# Patient Record
Sex: MALE | Race: WHITE | Employment: OTHER | URBAN - METROPOLITAN AREA
[De-identification: names, ages, dates, MRNs, and addresses within clinical notes are randomized per-mention and may not be internally consistent; named-entity substitution may affect disease eponyms.]

---

## 2019-08-07 ENCOUNTER — OFFICE VISIT (OUTPATIENT)
Dept: SLEEP MEDICINE | Facility: CLINIC | Age: 69
End: 2019-08-07
Payer: COMMERCIAL

## 2019-08-07 VITALS
HEART RATE: 89 BPM | HEIGHT: 69 IN | BODY MASS INDEX: 28.44 KG/M2 | OXYGEN SATURATION: 95 % | DIASTOLIC BLOOD PRESSURE: 83 MMHG | WEIGHT: 192 LBS | SYSTOLIC BLOOD PRESSURE: 129 MMHG

## 2019-08-07 DIAGNOSIS — R06.83 SNORING: Primary | ICD-10-CM

## 2019-08-07 PROCEDURE — 99203 OFFICE O/P NEW LOW 30 MIN: CPT | Performed by: INTERNAL MEDICINE

## 2019-08-07 RX ORDER — MULTIVITAMIN,THERAPEUTIC
1 TABLET ORAL DAILY
COMMUNITY

## 2019-08-07 RX ORDER — ONDANSETRON 8 MG/1
8 TABLET, FILM COATED ORAL EVERY 8 HOURS PRN
COMMUNITY

## 2019-08-07 RX ORDER — DIPHENHYDRAMINE HCL 25 MG
25 CAPSULE ORAL EVERY 8 HOURS
COMMUNITY
End: 2019-10-31

## 2019-08-07 RX ORDER — AMOXICILLIN 500 MG
1200 CAPSULE ORAL DAILY
COMMUNITY

## 2019-08-07 RX ORDER — IBUPROFEN 200 MG
200 TABLET ORAL EVERY 8 HOURS PRN
COMMUNITY

## 2019-08-07 RX ORDER — TAMSULOSIN HYDROCHLORIDE 0.4 MG/1
0.4 CAPSULE ORAL DAILY
COMMUNITY

## 2019-08-07 RX ORDER — UBIDECARENONE 100 MG
100 CAPSULE ORAL DAILY
COMMUNITY

## 2019-08-07 ASSESSMENT — MIFFLIN-ST. JEOR: SCORE: 1635.29

## 2019-08-07 NOTE — PROGRESS NOTES
Colin signed OANH to release results from testing at [facility] to [facility], listed Dr. Waldo Lennon, at [address] , phone is [phone] Fax:  [phone], as his PCP at [facility].  Route to Dr. Bundy for her information.  Colin would like test results communicated to Dr. Lennon.

## 2019-08-07 NOTE — NURSING NOTE
"Chief Complaint   Patient presents with     Consult     stopped breathing during colonoscopy, sleeps fine.         Initial /83   Pulse 89   Ht 1.759 m (5' 9.25\")   Wt 87.1 kg (192 lb)   SpO2 95%   BMI 28.15 kg/m   Estimated body mass index is 28.15 kg/m  as calculated from the following:    Height as of this encounter: 1.759 m (5' 9.25\").    Weight as of this encounter: 87.1 kg (192 lb).    Medication Reconciliation: complete    Neck circumference: 17 inches / 43 centimeters.    DME: no    ESS 0  "

## 2019-08-07 NOTE — PROGRESS NOTES
"Sleep Consultation Note:    Date on this visit: 8/7/2019    Colin Gastelum is sent by Waldo Lennon DO for a sleep consultation regarding concern for low oxygen saturation during the colonoscopy obtained in August 2018 and  snoring  Primary Physician: No Ref-Primary, Physician     Chief complaint: \"Stopped breathing during colonoscopy\"    Colin Gastelum 68 year old with PMH of renal calculus who presents to sleep clinic today at the request of his primary care provider for evaluation for possible sleep apnea. He had a colonoscopy done on 8/28/2018  and during the procedure, he had oxygen desaturation and the procedure had to be stopped due to the desaturation.  Upon waking his oxygen saturations returned to normal.  According to the primary care provider's note, as his colonoscopy was stopped early he does need to have a repeat colonoscopy. He  was advised that he be evaluated by sleep medicine prior to attempting colonoscopy again.     Colin never underwent sleep evaluation in the past.    He reports snoring.  There have not been any reports of observed apneas during sleep.  He denies snort arousals or awakenings due to gasping for air or choking.  He denies dry mouth or morning headaches.  He sleeps on his back on his sides.    He denies symptoms of restless legs syndrome. Once a  a week he notices that the sheets and blankets are tossed up in his bed.    He denies nightmares or sleepwalking or sleep eating or dream enactment behavior or cataplexy or sleep paralysis or hallucinations.    He retired last week. His bedtime is around 9:30 PM. He  he has no difficulty falling asleep. It approximately takes 10 minutes for him to fall asleep. He wakes up once to use the bathroom and is able to resume sleep within 10 minutes after the awakening.  He mostly wakes up spontaneously around 6:30 AM but sets up an alarm only if there is something that he needs to do.  He reports  feeling rested most  mornings.  " He denies fatigue or excessive daytime sleepiness. He endorses an Braymer sleepiness score of 0 out of 24.  He does not nap during the day.   He denies drowsiness while driving.  He denies falling asleep at stoplight or head bobbing while driving.  He does not read in the bed or use electronics in bed or watch television in bed.      Social History  Colin retired last week.  He works as . He is single.  He drinks 0 to 2 cups of coffee, none past 10 AM.  He drinks alcohol occasionally and does not drink close to bedtime. Does  not use alcohol as a sleep aid.  Smoking status: Current Some Day Smoker   Packs/day: 0.10   Years: 42.00   Types: Cigarettes   Start date: 1968   Smokeless tobacco: Never Used   Tobacco comment: 3 cigs daily or none.  He does not have plans to quit smoking currently.  Patient does not use drugs.    Allergies:    Allergies not on file    Medications:    Current Outpatient Medications   Medication Sig Dispense Refill     ascorbic acid (VITAMIN C) 500 MG CPCR CR capsule Take 500 mg by mouth daily       co-enzyme Q-10 100 MG CAPS capsule Take 100 mg by mouth daily       diphenhydrAMINE (BENADRYL) 25 MG capsule Take 25 mg by mouth every 8 hours       ibuprofen (ADVIL/MOTRIN) 200 MG tablet Take 200 mg by mouth every 8 hours as needed for mild pain       multivitamin, therapeutic (THERA-VIT) TABS tablet Take 1 tablet by mouth daily       Omega-3 Fatty Acids (FISH OIL) 1200 MG capsule Take 1,200 mg by mouth daily       ondansetron (ZOFRAN) 8 MG tablet Take 8 mg by mouth every 8 hours as needed for nausea       tamsulosin (FLOMAX) 0.4 MG capsule Take 0.4 mg by mouth daily           Problem List:  There are no active problems to display for this patient.       Past Medical/Surgical History:    Calcium oxalate renal stones      Left eye cataract    Detached retina, left     Family history of renal stone   COLONOSCOPY 08/28/2018 polyp, incomplete due to resp desaturation  RETINAL  "DETACHMENT REPAIR     Family History, pertinent to sleep disorders: His father, his older brother and paternal grandfather snored. He has a younger brother who snores    Review of Systems:  A complete review of systems reviewed by me is negative with the exeption of what has been mentioned in the history of present illness, and changes in his vision, retinal detachment and cataract in the left eye, he is thinking about getting  cataract surgery   CONSTITUTIONAL: NEGATIVE for weight gain/loss, fever, chills, sweats or night sweats.  ENT: NEGATIVE for ear pain, sore throat, sinus pain, post-nasal drip, runny nose, bloody nose  CARDIAC: NEGATIVE for fast heartbeats or fluttering in chest, chest pain or pressure, breathlessness when lying flat, swollen legs or swollen feet.  NEUROLOGIC: NEGATIVE headaches, weakness or numbness in the arms or legs.  DERMATOLOGIC: NEGATIVE for rashes, new moles or change in mole(s)  PULMONARY: NEGATIVE SOB at rest, SOB with activity, dry cough, productive cough, coughing up blood, wheezing or whistling when breathing.    GASTROINTESTINAL: NEGATIVE for nausea or vomitting, loose or watery stools, fat or grease in stools, constipation, abdominal pain, bowel movements black in color or blood noted.  GENITOURINARY: NEGATIVE for pain during urination, blood in urine, urinating more frequently than usual  MUSCULOSKELETAL: NEGATIVE for muscle pain, bone or joint pain, swollen joints.  ENDOCRINE: NEGATIVE for increased thirst or urination, diabetes.  LYMPHATIC: NEGATIVE for swollen lymph nodes, lumps or bumps in the breasts or nipple discharge.  PSYCHE: NEGATIVE for depression, anxiety    Physical Examination:  /83   Pulse 89   Ht 1.759 m (5' 9.25\")   Wt 87.1 kg (192 lb)   SpO2 95%   BMI 28.15 kg/m      General: No apparent distress, appropriately groomed  Head: Normocephalic, atraumatic  Eyes: no icterus, PERRL  Nose: Nares patent. No exudate/erythema. No septal deviation " noted.  Mouth: op pink and moist,Orophraynx: Opening is narrowed, uvula: Thick and elongated   Mallampati Class:III   Tonsillar Stage: 1+.  Neck: Supple, Circumference: 16.9 inches  Cardiac: Regular rate and rhythm  Chest: Symmetric air movement, lungs clear to auscultation bilaterally  Musculoskeletal: No edema noted  Skin: Warm, dry, intact  Psych: Mood pleasant, affect congruent  Neuro:  Mental status: Awake, alert, attentive, oriented.  Speech: Normal motor: Tone within normal limit  Gait: Normal     Impression/Plan:    Snoring, low oxygen saturation during the colonoscopy obtained in August 2018. Possible obstructive sleep apnea  STOP BANG score is 4/8. Patient likely has sleep apnea based on history of snoring, male gender, neck circumference and oropharyngeal anatomy.  Patient was interested in pursuing the home sleep study and mentioned that he would not be able to sleep in a sleep lab setting when he is being monitored.    We discussed HST, orders were generated in the [facility] for HST.  The plan is to communicate the results with the patient via phone call within 1 week after the completion of the test and patient was fine with me leaving a detailed voice message on his phone regarding the test results.  He does not believe that he may have sleep apnea and he thinks the oxygen levels during the colonoscopy may have dropped due to propofol.  He is not interested in pursuing an overnight polysomnogram if the HST is negative for ELAINE.    Diagnosis and treatment for ELAINE have been discussed. Complications of untreated ELAINE have also been discussed.    Patient was encouraged to quit smoking.    We discussed weight management with healthy diet, and exercise.    Patient was strongly advised to avoid driving, operating any heavy machinery or other hazardous situations while drowsy or sleepy.  Patient was counseled on the importance of driving while alert, to pull over if drowsy, or nap before getting into the vehicle if  "sleepy.    The above note was dictated using voice recognition software. Although reviewed after completion, some word and grammatical error may remain . Please contact the author for any clarifications.    \"I spent a total of 30 minutes face to face with Colin Gastelum during today's office visit. Over 50% of this time was spent counseling the patient and  coordinating care regarding sleep apnea, briefly the pathophysiology of ELAINE, HST/PSG, consequences of untreated sleep apnea.\"     Tiffanie Palomares MD   of Medicine,  Division of Pulmonary/Sleep Medicine  [facility].                "

## 2019-08-07 NOTE — PATIENT INSTRUCTIONS
"MY TREATMENT INFORMATION FOR SLEEP APNEA-  Colin Gastelum    DOCTOR : Tiffanie Bundy [facility]  SLEEP CENTER :      MY CONTACT NUMBER:     Am I having a sleep study at a sleep center?  Make sure you have an appointment for the study before you leave!    Am I having a home sleep study?  Watch this video:  [email or web address]/watch?v=[email or web address]_[email or web address]  Please verify your insurance coverage with your insurance carrier    Frequently asked questions:  1. What is Obstructive Sleep Apnea (ELAINE)? ELAINE is the most common type of sleep apnea. Apnea means, \"without breath.\"  Apnea is most often caused by narrowing or collapse of the upper airway as muscles relax during sleep.   Almost everyone has occasional apneas. Most people with sleep apnea have had brief interruptions at night frequently for many years.  The severity of sleep apnea is related to how frequent and severe the events are.   2. What are the consequences of ELAINE? Symptoms include: feeling sleepy during the day, snoring loudly, gasping or stopping of breathing, trouble sleeping, and occasionally morning headaches or heartburn at night.  Sleepiness can be serious and even increase the risk of falling asleep while driving. Other health consequences may include development of high blood pressure and other cardiovascular disease in persons who are susceptible. Untreated ELAINE  can contribute to heart disease, stroke and diabetes.   3. What are the treatment options? In most situations, sleep apnea is a lifelong disease that must be managed with daily therapy. Medications are not effective for sleep apnea and surgery is generally not considered until other therapies have been tried. Your treatment is your choice . Continuous Positive Airway (CPAP) works right away and is the therapy that is effective in nearly everyone. An oral device to hold your jaw forward is usually the next most reliable option. Other options " include postioning devices (to keep you off your back), weight loss, and surgery including a tongue pacing device. There is more detail about some of these options below.    Important tips for using CPAP and similar devices   Know your equipment:  CPAP is continuous positive airway pressure that prevents obstructive sleep apnea by keeping the throat from collapsing while you are sleeping. In most cases, the device is  smart  and can slowly self-adjusts if your throat collapses and keeps a record every day of how well you are treated-this information is available to you and your care team.  BPAP is bilevel positive airway pressure that keeps your throat open and also assists each breath with a pressure boost to maintain adequate breathing.  Special kinds of BPAP are used in patients who have inadequate breathing from lung or heart disease. In most cases, the device is  smart  and can slowly self-adjusts to assist breathing. Like CPAP, the device keeps a record of how well you are treated.  Your mask is your connection to the device. You get to choose what feels most comfortable and the staff will help to make sure if fits. Here: are some examples of the different masks that are available:       Key points to remember on your journey with sleep apnea:  1. Sleep study.  PAP devices often need to be adjusted during a sleep study to show that they are effective and adjusted right.  2. Good tips to remember: Try wearing just the mask during a quiet time during the day so your body adapts to wearing it. A humidifier is recommended for comfort in most cases to prevent drying of your nose and throat. Allergy medication from your provider may help you if you are having nasal congestion.  3. Getting settled-in. It takes more than one night for most of us to get used to wearing a mask. Try wearing just the mask during a quiet time during the day so your body adapts to wearing it. A humidifier is recommended for comfort in most  cases. Our team will work with you carefully on the first day and will be in contact within 4 days and again at 2 and 4 weeks for advice and remote device adjustments. Your therapy is evaluated by the device each day.   4. Use it every night. The more you are able to sleep naturally for 7-8 hours, the more likely you will have good sleep and to prevent health risks or symptoms from sleep apnea. Even if you use it 4 hours it helps. Occasionally all of us are unable to use a medical therapy, in sleep apnea, it is not dangerous to miss one night.   5. Communicate. Call our skilled team on the number provided on the first day if your visit for problems that make it difficult to wear the device. Over 2 out of 3 patients can learn to wear the device long-term with help from our team. Remember to call our team or your sleep providers if you are unable to wear the device as we may have other solutions for those who cannot adapt to mask CPAP therapy. It is recommended that you sleep your sleep provider within the first 3 months and yearly after that if you are not having problems.   6. Use it for your health. We encourage use of CPAP masks during daytime quiet periods to allow your face and brain to adapt to the sensation of CPAP so that it will be a more natural sensation to awaken to at night or during naps. This can be very useful during the first few weeks or months of adapting to CPAP though it does not help medically to wear CPAP during wakefulness and  should not be used as a strategy just to meet guidelines.  7. Take care of your equipment. Make sure you clean your mask and tubing using directions every day and that your filter and mask are replaced as recommended or if they are not working.     BESIDES CPAP, WHAT OTHER THERAPIES ARE THERE?    Positioning Device  Positioning devices are generally used when sleep apnea is mild and only occurs on your back.This example shows a pillow that straps around the waist. It  may be appropriate for those whose sleep study shows milder sleep apnea that occurs primarily when lying flat on one's back. Preliminary studies have shown benefit but effectiveness at home may need to be verified by a home sleep test. These devices are generally not covered by medical insurance.  Examples of devices that maintain sleeping on the back to prevent snoring and mild sleep apnea.    Belt type body positioner  Http://[employer].com/    Electronic reminder  Http://nightshifttherapy.com/  Http://www.InvenSense.SeatSwapr.au/      Oral Appliance  What is oral appliance therapy?  An oral appliance device fits on your teeth at night like a retainer used after having braces. The device is made by a specialized dentist and requires several visits over 1-2 months before a manufactured device is made to fit your teeth and is adjusted to prevent your sleep apnea. Once an oral device is working properly, snoring should be improved. A home sleep test may be recommended at that time if to determine whether the sleep apnea is adequately treated.       Some things to remember:  -Oral devices are often, but not always, covered by your medical insurance. Be sure to check with your insurance provider.   -If you are referred for oral therapy, you will be given a list of specialized dentists to consider or you may choose to visit the Web site of the American Academy of Dental Sleep Medicine  -Oral devices are less likely to work if you have severe sleep apnea or are extremely overweight.     More detailed information  An oral appliance is a small acrylic device that fits over the upper and lower teeth  (similar to a retainer or a mouth guard). This device slightly moves jaw forward, which moves the base of the tongue forward, opens the airway, improves breathing for effective treat snoring and obstructive sleep apnea in perhaps 7 out of 10 people .  The best working devices are custom-made by a dental device  after a mold is  made of the teeth 1, 2, 3.  When is an oral appliance indicated?  Oral appliance therapy is recommended as a first-line treatment for patients with primary snoring, mild sleep apnea, and for patients with moderate sleep apnea who prefer appliance therapy to use of CPAP4, 5. Severity of sleep apnea is determined by sleep testing and is based on the number of respiratory events per hour of sleep.   How successful is oral appliance therapy?  The success rate of oral appliance therapy in patients with mild sleep apnea is 75-80% while in patients with moderate sleep apnea it is 50-70%. The chance of success in patients with severe sleep apnea is 40-50%. The research also shows that oral appliances have a beneficial effect on the cardiovascular health of ELAINE patients at the same magnitude as CPAP therapy7.  Oral appliances should be a second-line treatment in cases of severe sleep apnea, but if not completely successful then a combination therapy utilizing CPAP plus oral appliance therapy may be effective. Oral appliances tend to be effective in a broad range of patients although studies show that the patients who have the highest success are females, younger patients, those with milder disease, and less severe obesity. 3, 6.   Finding a dentist that practices dental sleep medicine  Specific training is available through the American Academy of Dental Sleep Medicine for dentists interested in working in the field of sleep. To find a dentist who is educated in the field of sleep and the use of oral appliances, near you, visit the Web site of the American Academy of Dental Sleep Medicine.    References  1. Nehemias, et al. Objectively measured vs self-reported compliance during oral appliance therapy for sleep-disordered breathing. Chest 2013; 144(5): 9099-7037.  2. Yanna et al. Objective measurement of compliance during oral appliance therapy for sleep-disordered breathing. Thorax 2013; 68(1): 91-96.  3. Maddy  et al. Mandibular advancement devices in 620 men and women with ELAINE and snoring: tolerability and predictors of treatment success. Chest 2004; 125: 6516-5102.  4. Kelli et al. Oral appliances for snoring and ELAINE: a review. Sleep 2006; 29: 244-262.  5. Daniel et al. Oral appliance treatment for ELAINE: an update. J Clin Sleep Med 2014; 10(2): 215-227.  6. Derick et al. Predictors of OSAH treatment outcome. J Dent Res 2007; 86: 7226-6231.      Weight Loss:    Weight loss is a long-term strategy that may improve sleep apnea in some patients.    Weight management is a personal decision and the decision should be based on your interest and the potential benefits.  If you are interested in exploring weight loss strategies, the following discussion covers the impact on weight loss on sleep apnea and the approaches that may be successful.    Being overweight does not necessarily mean you will have health consequences.  Those who have BMI over 35 or over 27 with existing medical conditions carries greater risk.   Weight loss decreases severity of sleep apnea in most people with obesity. For those with mild obesity who have developed snoring with weight gain, even 15-30 pound weight loss can improve and occasionally eliminate sleep apnea.  Structured and life-long dietary and health habits are necessary to lose weight and keep healthier weight levels.     Though there may be significant health benefits from weight loss, long-term weight loss is very difficult to achieve- studies show success with dietary management in less than 10% of people. In addition, substantial weight loss may require years of dietary control and may be difficult if patients have severe obesity. In these cases, surgical management may be considered.  Finally, older individuals who have tolerated obesity without health complications may be less likely to benefit from weight loss strategies.        Your BMI is There is no height or weight on file  to calculate BMI.  Weight management is a personal decision.  If you are interested in exploring weight loss strategies, the following discussion covers the approaches that may be successful. Body mass index (BMI) is one way to tell whether you are at a healthy weight, overweight, or obese. It measures your weight in relation to your height.  A BMI of 18.5 to 24.9 is in the healthy range. A person with a BMI of 25 to 29.9 is considered overweight, and someone with a BMI of 30 or greater is considered obese. More than two-thirds of American adults are considered overweight or obese.  Being overweight or obese increases the risk for further weight gain. Excess weight may lead to heart disease and diabetes.  Creating and following plans for healthy eating and physical activity may help you improve your health.  Weight control is part of healthy lifestyle and includes exercise, emotional health, and healthy eating habits. Careful eating habits lifelong are the mainstay of weight control. Though there are significant health benefits from weight loss, long-term weight loss with diet alone may be very difficult to achieve- studies show long-term success with dietary management in less than 10% of people. Attaining a healthy weight may be especially difficult to achieve in those with severe obesity. In some cases, medications, devices and surgical management might be considered.  What can you do?  If you are overweight or obese and are interested in methods for weight loss, you should discuss this with your provider.     Consider reducing daily calorie intake by 500 calories.     Keep a food journal.     Avoiding skipping meals, consider cutting portions instead.    Diet combined with exercise helps maintain muscle while optimizing fat loss. Strength training is particularly important for building and maintaining muscle mass. Exercise helps reduce stress, increase energy, and improves fitness. Increasing exercise without  diet control, however, may not burn enough calories to loose weight.       Start walking three days a week 10-20 minutes at a time    Work towards walking thirty minutes five days a week     Eventually, increase the speed of your walking for 1-2 minutes at time    In addition, we recommend that you review healthy lifestyles and methods for weight loss available through the National Institutes of Health patient information sites:  http://win.niddk.nih.gov/publications/index.htm    And look into health and wellness programs that may be available through your health insurance provider, employer, local community center, or tho club.          Surgery:    Surgery for obstructive sleep apnea is considered generally only when other therapies fail to work. Surgery may be discussed with you if you are having a difficult time tolerating CPAP and or when there is an abnormal structure that requires surgical correction.  Nose and throat surgeries often enlarge the airway to prevent collapse.  Most of these surgeries create pain for 1-2 weeks and up to half of the most common surgeries are not effective throughout life.  You should carefully discuss the benefits and drawbacks to surgery with your sleep provider and surgeon to determine if it is the best solution for you.   More information  Surgery for ELAINE is directed at areas that are responsible for narrowing or complete obstruction of the airway during sleep.  There are a wide range of procedures available to enlarge and/or stabilize the airway to prevent blockage of breathing in the three major areas where it can occur: the palate, tongue, and nasal regions.  Successful surgical treatment depends on the accurate identification of the factors responsible for obstructive sleep apnea in each person.  A personalized approach is required because there is no single treatment that works well for everyone.  Because of anatomic variation, consultation with an examination by a sleep  surgeon is a critical first step in determining what surgical options are best for each patient.  In some cases, examination during sedation may be recommended in order to guide the selection of procedures.  Patients will be counseled about risks and benefits as well as the typical recovery course after surgery. Surgery is typically not a cure for a person s ELAINE.  However, surgery will often significantly improve one s ELAINE severity (termed  success rate ).  Even in the absence of a cure, surgery will decrease the cardiovascular risk associated with OSA7; improve overall quality of life8 (sleepiness, functionality, sleep quality, etc).      Palate Procedures:  Patients with ELAINE often have narrowing of their airway in the region of their tonsils and uvula.  The goals of palate procedures are to widen the airway in this region as well as to help the tissues resist collapse.  Modern palate procedure techniques focus on tissue conservation and soft tissue rearrangement, rather than tissue removal.  Often the uvula is preserved in this procedure. Residual sleep apnea is common in patient after pharyngoplasty with an average reduction in sleep apnea events of 33%2.      Tongue Procedures:  ExamWhile patients are awake, the muscles that surround the throat are active and keep this region open for breathing. These muscles relax during sleep, allowing the tongue and other structures to collapse and block breathing.  There are several different tongue procedures available.  Selection of a tongue base procedure depends on characteristics seen on physical exam.  Generally, procedures are aimed at removing bulky tissues in this area or preventing the back of the tongue from falling back during sleep.  Success rates for tongue surgery range from 50-62%3.    Hypoglossal Nerve Stimulation:  Hypoglossal nerve stimulation has recently received approval from the [place] Food and Drug Administration for the treatment of  obstructive sleep apnea.  This is based on research showing that the system was safe and effective in treating sleep apnea6.  Results showed that the median AHI score decreased 68%, from 29.3 to 9.0. This therapy uses an implant system that senses breathing patterns and delivers mild stimulation to airway muscles, which keeps the airway open during sleep.  The system consists of three fully implanted components: a small generator (similar in size to a pacemaker), a breathing sensor, and a stimulation lead.  Using a small handheld remote, a patient turns the therapy on before bed and off upon awakening.    Candidates for this device must be greater than 22 years of age, have moderate to severe ELAINE (AHI between 20-65), BMI less than 32, have tried CPAP/oral appliance without success, and have appropriate upper airway anatomy (determined by a sleep endoscopy performed by Dr. Crockett).    Hypoglossal Nerve Stimulation Pathway:    The sleep surgeon s office will work with the patient through the insurance prior-authorization process (including communications and appeals).    Nasal Procedures:  Nasal obstruction can interfere with nasal breathing during the day and night.  Studies have shown that relief of nasal obstruction can improve the ability of some patients to tolerate positive airway pressure therapy for obstructive sleep apnea1.  Treatment options include medications such as nasal saline, topical corticosteroid and antihistamine sprays, and oral medications such as antihistamines or decongestants. Non-surgical treatments can include external nasal dilators for selected patients. If these are not successful by themselves, surgery can improve the nasal airway either alone or in combination with these other options.      Combination Procedures:  Combination of surgical procedures and other treatments may be recommended, particularly if patients have more than one area of narrowing or persistent positional disease.  The  success rate of combination surgery ranges from 66-80%2,3.    References  1. Pema COOK. The Role of the Nose in Snoring and Obstructive Sleep Apnoea: An Update.  Eur Arch Otorhinolaryngol. 2011; 268: 1365-73.  2.  Catrachito SM; Brian JA; Jennifer JR; Pallanch JF; Carlos MB; Serge SG; Melonie PENN. Surgical modifications of the upper airway for obstructive sleep apnea in adults: a systematic review and meta-analysis. SLEEP 2010;33(10):3643-6059. Tali HAY. Hypopharyngeal surgery in obstructive sleep apnea: an evidence-based medicine review.  Arch Otolaryngol Head Neck Surg. 2006 Feb;132(2):206-13.  3. Thanh YH1, Briana Y, Trae EYAL. The efficacy of anatomically based multilevel surgery for obstructive sleep apnea. Otolaryngol Head Neck Surg. 2003 Oct;129(4):327-35.  4. Tali HAY, Goldberg A. Hypopharyngeal Surgery in Obstructive Sleep Apnea: An Evidence-Based Medicine Review. Arch Otolaryngol Head Neck Surg. 2006 Feb;132(2):206-13.  5. Kayli PJ et al. Upper-Airway Stimulation for Obstructive Sleep Apnea.  N Engl J Med. 2014 Jan 9;370(2):139-49.  6. Jak Y et al. Increased Incidence of Cardiovascular Disease in Middle-aged Men with Obstructive Sleep Apnea. Am J Respir Crit Care Med; 2002 166: 159-165  7. Merrill EM et al. Studying Life Effects and Effectiveness of Palatopharyngoplasty (SLEEP) study: Subjective Outcomes of Isolated Uvulopalatopharyngoplasty. Otolaryngol Head Neck Surg. 2011; 144: 623-631.  Your blood pressure was checked while you were in clinic today.  Please read the guidelines below about what these numbers mean and what you should do about them.  Your systolic blood pressure is the top number.  This is the pressure when the heart is pumping.  Your diastolic blood pressure is the bottom number.  This is the pressure in between beats.  If your systolic blood pressure is less than 120 and your diastolic blood pressure is less than 80, then your blood pressure is normal. There is nothing more that you  need to do about it  If your systolic blood pressure is 120-139 or your diastolic blood pressure is 80-89, your blood pressure may be higher than it should be.  You should have your blood pressure re-checked within a year by a primary care provider.  If your systolic blood pressure is 140 or greater or your diastolic blood pressure is 90 or greater, you may have high blood pressure.  High blood pressure is treatable, but if left untreated over time it can put you at risk for heart attack, stroke, or kidney failure.  You should have your blood pressure re-checked by a primary care provider within the next four weeks.  Your BMI is There is no height or weight on file to calculate BMI.  Weight management is a personal decision.  If you are interested in exploring weight loss strategies, the following discussion covers the approaches that may be successful. Body mass index (BMI) is one way to tell whether you are at a healthy weight, overweight, or obese. It measures your weight in relation to your height.  A BMI of 18.5 to 24.9 is in the healthy range. A person with a BMI of 25 to 29.9 is considered overweight, and someone with a BMI of 30 or greater is considered obese. More than two-thirds of American adults are considered overweight or obese.  Being overweight or obese increases the risk for further weight gain. Excess weight may lead to heart disease and diabetes.  Creating and following plans for healthy eating and physical activity may help you improve your health.  Weight control is part of healthy lifestyle and includes exercise, emotional health, and healthy eating habits. Careful eating habits lifelong are the mainstay of weight control. Though there are significant health benefits from weight loss, long-term weight loss with diet alone may be very difficult to achieve- studies show long-term success with dietary management in less than 10% of people. Attaining a healthy weight may be especially difficult to  achieve in those with severe obesity. In some cases, medications, devices and surgical management might be considered.  What can you do?  If you are overweight or obese and are interested in methods for weight loss, you should discuss this with your provider.     Consider reducing daily calorie intake by 500 calories.     Keep a food journal.     Avoiding skipping meals, consider cutting portions instead.    Diet combined with exercise helps maintain muscle while optimizing fat loss. Strength training is particularly important for building and maintaining muscle mass. Exercise helps reduce stress, increase energy, and improves fitness. Increasing exercise without diet control, however, may not burn enough calories to loose weight.       Start walking three days a week 10-20 minutes at a time    Work towards walking thirty minutes five days a week     Eventually, increase the speed of your walking for 1-2 minutes at time    In addition, we recommend that you review healthy lifestyles and methods for weight loss available through the National Institutes of Health patient information sites:  http://win.niddk.nih.gov/publications/index.htm    And look into health and wellness programs that may be available through your health insurance provider, employer, local community center, or tho club.

## 2019-08-08 ENCOUNTER — OFFICE VISIT (OUTPATIENT)
Dept: SLEEP MEDICINE | Facility: CLINIC | Age: 69
End: 2019-08-08
Payer: COMMERCIAL

## 2019-08-08 ENCOUNTER — DOCUMENTATION ONLY (OUTPATIENT)
Dept: SLEEP MEDICINE | Facility: CLINIC | Age: 69
End: 2019-08-08

## 2019-08-08 DIAGNOSIS — G47.33 OSA ON CPAP: ICD-10-CM

## 2019-08-08 DIAGNOSIS — R06.83 SNORING: ICD-10-CM

## 2019-08-08 DIAGNOSIS — G47.33 OSA (OBSTRUCTIVE SLEEP APNEA): Primary | ICD-10-CM

## 2019-08-08 PROCEDURE — G0399 HOME SLEEP TEST/TYPE 3 PORTA: HCPCS | Performed by: INTERNAL MEDICINE

## 2019-08-09 ENCOUNTER — DOCUMENTATION ONLY (OUTPATIENT)
Dept: SLEEP MEDICINE | Facility: CLINIC | Age: 69
End: 2019-08-09
Payer: COMMERCIAL

## 2019-08-14 NOTE — PROCEDURES
"HOME SLEEP STUDY INTERPRETATION    Patient: Colin Gastelum  MRN: [id number]  YOB: 1950  Study Date:2019   Referring Provider: Waldo Lennon  Ordering Provider:Tiffanie Palomares MD      Indications for Home Study: Colin Gastelum is a 68 year old male who was recently seen at the sleep clinic for consultation regarding  low oxygen saturation during the colonoscopy obtained in 2018 and snoring.  Home sleep study was obtained to evaluate for possible obstructive sleep apnea.    Estimated body mass index is 28.15 kg/m  as calculated from the following:    Height as of 19: 1.759 m (5' 9.25\").    Weight as of 19: 87.1 kg (192 lb).  [place] Sleepiness Scale:   STOP-BAN/8    Data: A full night home sleep study was performed recording the standard physiologic parameters including body position, movement, sound, nasal pressure, thermal oral airflow, chest and abdominal movements with respiratory inductance plethysmography, and oxygen saturation by pulse oximetry. Pulse rate was estimated by oximetry recording. This study was considered adequate  based on > 4 hours of quality oximetry and respiratory recording. As specified by the AASM Manual for the Scoring of Sleep and Associated events, version 2.3, Rule VIII.D 1B, 4% oxygen desaturation scoring for hypopneas is used as a standard of care on all home sleep apnea testing.    Analysis Time: 491.0 minutes    Respiration:   Sleep Associated Hypoxemia: sustained hypoxemia was present. Baseline oxygen saturation was 90.1 %.  Time with saturation less than or equal to 88% was 139.0 minutes. The lowest oxygen saturation was 74 %.   Snoring: Snoring was present.  Respiratory events: The home study revealed a presence of 227 obstructive apneas and 2 mixed and central apneas. There were 174 hypopneas resulting in a combined apnea/hypopnea index [AHI] of 49.3 events per hour.  AHI was 40.8 per hour supine, 78.7 per " hour prone, 43.9 per hour on left side, and 17.3 per hour on right side.   Pattern: Excluding events noted above, respiratory rate and pattern was Normal.    Position: Percent of time spent: supine -1.3 %, prone -36.3 %, on left -35.0 %, on right -27.3 %.    Heart Rate: By pulse oximetry normal rate was noted.     Assessment:   Severe obstructive sleep apnea.  Sleep associated hypoxemia was present.    Recommendations:  Suggest empiric treatment with auto-CPAP at 5-15 cmH2O or supervised polysomnography with titration using positive airway pressure device to establish optimal treatment to control the sleep related breathing disorder.  Suggest optimizing sleep hygiene and avoiding sleep deprivation.  Weight management.    Diagnosis Code(s): Obstructive Sleep Apnea G47.33, Hypoxemia G47.36, Snoring R06.83    Tiffanie Palomares MD, August 14, 2019   Diplomate, American Board of Internal Medicine, Sleep Medicine    Addendum: I called the patient on August 9, 2019 and reviewed the results of the HST via phone.  Patient was interested in the option of initiating treatment using auto CPAP.  Will generate the DME orders for auto CPAP, schedule DME visit for him to obtain the device.  Patient was instructed to use the device regularly during sleep and get back to us if any interface problems or concerns.   Recommend obtaining overnight oximetry about 2 weeks after he starts the CPAP treatment to check for resolution of hypoxemia that was observed during the HST.Plan is to follow-up at sleep clinic 6 to 8 weeks after he starts treatment.     Patient was instructed to avoid driving or operating heavy machinery if drowsy or sleepy to prevent accidents.    Tiffanie Palomaers MD   of Medicine,  Division of Pulmonary/Sleep Medicine  [facility].

## 2019-08-15 ENCOUNTER — TELEPHONE (OUTPATIENT)
Dept: SLEEP MEDICINE | Facility: CLINIC | Age: 69
End: 2019-08-15

## 2019-08-15 ENCOUNTER — CARE COORDINATION (OUTPATIENT)
Dept: SLEEP MEDICINE | Facility: CLINIC | Age: 69
End: 2019-08-15

## 2019-08-15 NOTE — PROGRESS NOTES
Faxed copy of HST to Dr. Waldo Lennon at [facility] at fax:  [phone] as requested by patient.  Patient wants to follow up with PCP at [facility] to obtain any therapy.  Route to Dr. Bundy for her information.

## 2019-08-15 NOTE — TELEPHONE ENCOUNTER
Pt called to schedule Cpap set (wasn't aware he is a [place] pt)  Pt states that he will discuss results with his Primary Care Doctor and if he feels this is an apporpriate treatment he will then see if his PCP can write orders and continue with his cpap set up and treatment through [facility].  If this is not the case he will then call back and schedule set up in [place].  Number to DME provided.    HST orders faxed to Dr. Waldo Lennon at [facility] fax #[phone]    CATHERINE Lujan

## 2019-08-21 ENCOUNTER — TELEPHONE (OUTPATIENT)
Dept: SLEEP MEDICINE | Facility: CLINIC | Age: 69
End: 2019-08-21

## 2019-08-21 NOTE — TELEPHONE ENCOUNTER
Called patient to schedule APAP setup. Scheduled for 8/27/19 at 1PM at [facility]. Provided patient with [id number] and phone number.

## 2019-08-27 ENCOUNTER — DOCUMENTATION ONLY (OUTPATIENT)
Dept: SLEEP MEDICINE | Facility: CLINIC | Age: 69
End: 2019-08-27
Payer: COMMERCIAL

## 2019-08-27 NOTE — PROGRESS NOTES
Patient was offered choice of vendor and chose [facility].  Patient Colin Gastelum was set up at [place] on August 27, 2019. Patient received a Resmed AirSense 10 Auto. Pressures were set at 5-15 cm H2O.   Patient s ramp is 5 cm H2O for Auto and FLEX/EPR is EPR, 2.  Patient received a Resmed Mask name: [id number]  Pillow mask size Large, heated tubing and heated humidifier.  Patient is enrolled in the STM Program and does need to meet compliance. Patient has a follow up on 10/30/19 with Dr. Palomares.    GAYATHRI KEATING

## 2019-08-30 ENCOUNTER — DOCUMENTATION ONLY (OUTPATIENT)
Dept: SLEEP MEDICINE | Facility: CLINIC | Age: 69
End: 2019-08-30

## 2019-08-30 DIAGNOSIS — G47.33 OSA ON CPAP: Primary | ICD-10-CM

## 2019-08-30 NOTE — PROGRESS NOTES
3 DAY STM VISIT    Diagnostic AHI:   49.3 HST    Patient contacted for 3 day STM visit  Subjective measures:  Pt is feeling air hunger when first starting the therapy.  He would like to start at a higher pressure setting.  He is going out of town for the weekend and will not be bring device with him.      Device type: Auto-CPAP  PAP settings from order::  CPAP min 5 cm  H20       CPAP max 15 cm  H20         Mask type:    Nasal Mask     Device settings from machine      Min CPAP 5.0            Max CPAP 15.0             Assessment: one  Night of usage over four hours.   Action plan: Pt to have f/u 14 day STM visit.  Patient has a follow up visit scheduled:   yes within 31-90 days of set up.  Order sent to provider to increase starting pressure to 7 cm .     Total time spent on remote patient monitoring data analysis and patient contact today:   15 minutes

## 2019-09-11 ENCOUNTER — DOCUMENTATION ONLY (OUTPATIENT)
Dept: SLEEP MEDICINE | Facility: CLINIC | Age: 69
End: 2019-09-11

## 2019-09-11 NOTE — PROGRESS NOTES
14  DAY STM VISIT    Diagnostic AHI:   49.3 HST    Message left for patient to return call     Assessment: Pt not meeting objective benchmarks for compliance       Action plan: waiting for patient to return call.  and pt to have 30 day STM visit.      Device type: Auto-CPAP    PAP settings: CPAP min 7.0 cm  H20       CPAP max 15.0 cm  H20        95th% pressure 10.3 cm  H20     Mask type:  Nasal Mask    Objective measures: 14 day rolling measures      Compliance  64 %      Leak  15.87 lpm  last  upload      AHI 2.96   last  upload      Average number of minutes 283      Objective measure goal  Compliance   Goal >70%  Leak   Goal < 24 lpm  AHI  Goal < 5  Usage  Goal >240      Total time spent on remote patient monitoring data analysis and patient contact today:   10 minutes

## 2019-09-24 DIAGNOSIS — G47.33 OSA ON CPAP: Primary | ICD-10-CM

## 2019-09-27 ENCOUNTER — DOCUMENTATION ONLY (OUTPATIENT)
Dept: SLEEP MEDICINE | Facility: CLINIC | Age: 69
End: 2019-09-27

## 2019-09-27 NOTE — PROGRESS NOTES
30 DAY [facility] VISIT       Message left for patient to return call     Assessment: Pt meeting objective benchmarks.     Action plan: waiting for patient to return call.   Patient has scheduled a follow up visit with Dr. Palomares on 10/31/2019.   Device type: Auto-CPAP ()  PAP settings: CPAP min 7.0  cm  H20     CPAP max 15.0  cm  H20        95th% pressure 11.3 cm  H20   Mask type:  Nasal Mask  Objective measures: 14 day rolling measures      Compliance  92 %      Leak  11.2 lpm  last  upload      AHI 3.11   last  upload      Average number of minutes 475      Objective measure goal  Compliance   Goal >70%  Leak   Goal < 24 lpm  AHI  Goal < 5  Usage  Goal >240      Total time spent on remote patient monitoring data analysis and patient contact today:   10 minutes

## 2019-09-27 NOTE — PROGRESS NOTES
Patient returned call.    Subjective measures:   Pt is feeling benefit form therapy.  He feels that he would like to start at a higher pressure.      Assessment: Pt meeting objective benchmarks.  Patient failing following subjective benchmarks: pressure issues    Action plan:pt to have 6 month [facility] visit  Order placed to increase starting pressure     Total time spent on remote patient monitoring data analysis and patient contact today:   15 minutes

## 2019-10-01 DIAGNOSIS — G47.33 OSA ON CPAP: Primary | ICD-10-CM

## 2019-10-31 ENCOUNTER — OFFICE VISIT (OUTPATIENT)
Dept: SLEEP MEDICINE | Facility: CLINIC | Age: 69
End: 2019-10-31
Payer: COMMERCIAL

## 2019-10-31 VITALS
SYSTOLIC BLOOD PRESSURE: 148 MMHG | BODY MASS INDEX: 29.03 KG/M2 | WEIGHT: 196 LBS | RESPIRATION RATE: 16 BRPM | DIASTOLIC BLOOD PRESSURE: 90 MMHG | HEART RATE: 86 BPM | HEIGHT: 69 IN | OXYGEN SATURATION: 97 %

## 2019-10-31 DIAGNOSIS — G47.33 OSA ON CPAP: Primary | ICD-10-CM

## 2019-10-31 PROCEDURE — 99214 OFFICE O/P EST MOD 30 MIN: CPT | Performed by: INTERNAL MEDICINE

## 2019-10-31 ASSESSMENT — MIFFLIN-ST. JEOR: SCORE: 1648.43

## 2019-10-31 NOTE — Clinical Note
Manish Melendez,I had previously generated orders for TERRENCE for the patient which he never pursued so far. He mentioned that he is  currently busy and wants us to remind him in the next 3 weeks preferably calling him on either Monday or Tuesday about scheduling the TERRENCE. Results of the TERRENCE will be communicated with patient via my chart.Thanks,Oliver

## 2019-10-31 NOTE — PROGRESS NOTES
SLEEP CLINIC FOLLOW UP VISIT    Date of visit: October 31, 2019    Purpose of visit: Follow-up of ELAINE, review CPAP compliance measures    History of present illness: Colin Gastelum is a 69-year-old male who was diagnosed with severe obstructive sleep apnea and sleep associated hypoxemia following home sleep study obtained on 8/8/19.  He was set up at [place] on August 27, 2019,  with a Resmed AirSense 10 Auto, pressures  set at 5-15 cm H2O. He uses nasal pillows.  Patient is  being followed through the [facility] Program and does need to meet compliance.     He reports using the CPAP regularly during sleep.  He sleeps alone and hence it is not anyone to report whether or not he snores or has any apneas  with CPAP. He denies awakenings due to gasping for air or choking with CPAP.  Since the minimum pressure settings on his auto CPAP were increased to 9 cmH2O the symptoms of air hunger have resolved and he reports good tolerance to the current pressure settings.  Since he has been using the CPAP he noticed that he is able to wake up in the morning quickly feels less tired compared to before though lately he attributes this tiredness to allergies.  He denies excessive daytime sleepiness.  He endorses an [place] sleepiness score of 2 out of 24.  He denies drowsiness while driving.    Downloadable compliance data from September 30, 2019 through October 29, 2019 was reviewed he has used the device for 26 out of 30 days.  83% days with device usage >=4 hours. Average duration of usage 7 hours and  54 minutes.  Median pressure 9.6; 95th percentile 11.6 and maximum 12.8 cm water.  95th percentile for airleak 19.2 L/min  Residual AHI is 2.5/h    HST results date: 8/8/19:  Analysis Time: 491.0 minutes     Respiration:   Sleep Associated Hypoxemia: sustained hypoxemia was present. Baseline oxygen saturation was 90.1 %.  Time with saturation less than or equal to 88% was 139.0 minutes. The lowest oxygen saturation was 74 %.  "  Snoring: Snoring was present.  Respiratory events: The home study revealed a presence of 227 obstructive apneas and 2 mixed and central apneas. There were 174 hypopneas resulting in a combined apnea/hypopnea index [AHI] of 49.3 events per hour.  AHI was 40.8 per hour supine, 78.7 per hour prone, 43.9 per hour on left side, and 17.3 per hour on right side.   Pattern: Excluding events noted above, respiratory rate and pattern was Normal.     Position: Percent of time spent: supine -1.3 %, prone -36.3 %, on left -35.0 %, on right -27.3 %.     Heart Rate: By pulse oximetry normal rate was noted.      Assessment:   Severe obstructive sleep apnea.  Sleep associated hypoxemia was present.      Current meds, Past medical history, Past surgical history, Allergies, Social history, Family history: reviewed, per EMR    Exam:  BP (!) 148/90   Pulse 86   Resp 16   Ht 1.759 m (5' 9.25\")   Wt 88.9 kg (196 lb)   SpO2 97%   BMI 28.73 kg/m    General appearance:  in no apparent distress  Pt is dressed casually, cooperative with good eye contact.   Speech is spontaneous with regular rate and volume.   Mood: euthymic; affect congruent with full range and intensity.   Sensorium: awake, alert and oriented to person, place, time, and situation.      Assessment/plan:  Severe obstructive sleep apnea with sleep associated hypoxemia.  Patient reports adequate compliance with CPAP device and reports positive benefits with device use and based on the compliance measures ELAINE is well controlled with CPAP at the current pressure settings.  Recommended obtaining an overnight oximetry with the auto CPAP at current pressure settings to check for resolution of sleep associated hypoxemia that was noted during the home sleep study.  He is  currently busy and wants our clinic to remind him in the next 3 weeks preferably calling him on either Monday or Tuesday about the overnight oximetry.  Results of the TERRENCE will be communicated with patient via " "my chart.  We also discussed if the overnight oximetry is abnormal we may consider pursuing   polysomnography to establish the optimum treatment to control the sleep-related breathing disorder. If oximetry is normal, he will follow-up at sleep clinic in 1 year or sooner if any concerns.    Recommended him to continue using the device regularly during sleep and instructed him to get the supplies for the device regularly replaced.    We discussed weight management with diet and exercise    His blood pressure was elevated today and he attributes it to the eye discomfort since his cataract surgery and also the decongestant he has been using. He reported  that he monitors his blood pressure at home and it is not always elevated.  He was instructed to continue monitoring the blood pressure and follow-up with his primary care provider.      Patient was strongly advised to avoid driving, operating any heavy machinery or other hazardous situations while drowsy or sleepy.  Patient was counseled on the importance of driving while alert, to pull over if drowsy, or nap before getting into the vehicle if sleepy.       The above note was dictated using voice recognition software. Although reviewed after completion, some word and grammatical error may remain . Please contact the author for any clarifications.    \"I spent a total of 25 minutes face to face with Colin Gastelum during today's office visit. Over 50% of this time was spent counseling the patient and  coordinating care regarding sleep apnea, CPAP treatment, hypoxemia, elevated blood pressure.\"       Tiffanie Palomares MD   of Medicine,  Division of Pulmonary/Sleep Medicine  [facility].         "

## 2019-10-31 NOTE — PATIENT INSTRUCTIONS
Your BMI is Body mass index is 28.73 kg/m .  Weight management is a personal decision.  If you are interested in exploring weight loss strategies, the following discussion covers the approaches that may be successful. Body mass index (BMI) is one way to tell whether you are at a healthy weight, overweight, or obese. It measures your weight in relation to your height.  A BMI of 18.5 to 24.9 is in the healthy range. A person with a BMI of 25 to 29.9 is considered overweight, and someone with a BMI of 30 or greater is considered obese. More than two-thirds of American adults are considered overweight or obese.  Being overweight or obese increases the risk for further weight gain. Excess weight may lead to heart disease and diabetes.  Creating and following plans for healthy eating and physical activity may help you improve your health.  Weight control is part of healthy lifestyle and includes exercise, emotional health, and healthy eating habits. Careful eating habits lifelong are the mainstay of weight control. Though there are significant health benefits from weight loss, long-term weight loss with diet alone may be very difficult to achieve- studies show long-term success with dietary management in less than 10% of people. Attaining a healthy weight may be especially difficult to achieve in those with severe obesity. In some cases, medications, devices and surgical management might be considered.  What can you do?  If you are overweight or obese and are interested in methods for weight loss, you should discuss this with your provider.     Consider reducing daily calorie intake by 500 calories.     Keep a food journal.     Avoiding skipping meals, consider cutting portions instead.    Diet combined with exercise helps maintain muscle while optimizing fat loss. Strength training is particularly important for building and maintaining muscle mass. Exercise helps reduce stress, increase energy, and improves fitness.  Increasing exercise without diet control, however, may not burn enough calories to loose weight.       Start walking three days a week 10-20 minutes at a time    Work towards walking thirty minutes five days a week     Eventually, increase the speed of your walking for 1-2 minutes at time    In addition, we recommend that you review healthy lifestyles and methods for weight loss available through the National Institutes of Health patient information sites:  http://win.niddk.nih.gov/publications/index.htm    And look into health and wellness programs that may be available through your health insurance provider, employer, local community center, or tho club.    Weight management plan: Patient was referred to their PCP to discuss a diet and exercise plan.    Colin to follow up with Primary Care provider regarding elevated blood pressure.

## 2020-03-03 ENCOUNTER — DOCUMENTATION ONLY (OUTPATIENT)
Dept: SLEEP MEDICINE | Facility: CLINIC | Age: 70
End: 2020-03-03

## 2020-03-03 NOTE — PROGRESS NOTES
6 month [facility]    STM Recheck Visit     Diagnostic AHI:   49.3  HST    Data only recheck     Assessment: Pt meeting objective benchmarks.     Action plan:   pt to follow up per provider request       Device type: Auto-CPAP  PAP settings: CPAP min 9.0 cm  H20     CPAP max 15.0 cm  H20         95th% pressure 13.4 cm  H20   Objective measures: 14 day rolling measures      Compliance  85 %      Leak  21.36 lpm  last  upload      AHI 2.69   last  upload      Average number of minutes 417      Objective measure goal  Compliance   Goal >70%  Leak   Goal < 24 lpm  AHI  Goal < 5  Usage  Goal >240    Total time spent on accessing, reviewing and interpreting remote patient PAP therapy data:   10 minutes      Total time spent with direct patient communication :   0 minutes

## 2021-10-26 DIAGNOSIS — G47.33 OBSTRUCTIVE SLEEP APNEA (ADULT) (PEDIATRIC): Primary | ICD-10-CM

## 2024-08-29 ENCOUNTER — MEDICAL CORRESPONDENCE (OUTPATIENT)
Dept: HEALTH INFORMATION MANAGEMENT | Facility: CLINIC | Age: 74
End: 2024-08-29
Payer: COMMERCIAL

## 2024-08-30 ENCOUNTER — TRANSCRIBE ORDERS (OUTPATIENT)
Dept: OTHER | Age: 74
End: 2024-08-30

## 2024-08-30 DIAGNOSIS — Z12.11 SCREENING FOR COLON CANCER: Primary | ICD-10-CM
